# Patient Record
Sex: FEMALE | Race: WHITE | URBAN - METROPOLITAN AREA
[De-identification: names, ages, dates, MRNs, and addresses within clinical notes are randomized per-mention and may not be internally consistent; named-entity substitution may affect disease eponyms.]

---

## 2022-01-27 ENCOUNTER — OFFICE VISIT (OUTPATIENT)
Dept: URBAN - METROPOLITAN AREA CLINIC 51 | Facility: CLINIC | Age: 57
End: 2022-01-27
Payer: COMMERCIAL

## 2022-01-27 DIAGNOSIS — H25.813 COMBINED FORMS OF AGE-RELATED CATARACT, BILATERAL: ICD-10-CM

## 2022-01-27 DIAGNOSIS — H43.313 VITREOUS MEMBRANES AND STRANDS, BILATERAL: ICD-10-CM

## 2022-01-27 DIAGNOSIS — H52.4 PRESBYOPIA: ICD-10-CM

## 2022-01-27 DIAGNOSIS — H04.123 TEAR FILM INSUFFICIENCY OF BILATERAL LACRIMAL GLANDS: ICD-10-CM

## 2022-01-27 DIAGNOSIS — E11.9 TYPE 2 DIABETES MELLITUS WITHOUT COMPLICATIONS: Primary | ICD-10-CM

## 2022-01-27 DIAGNOSIS — Z79.84 LONG TERM (CURRENT) USE OF ORAL ANTIDIABETIC DRUGS: ICD-10-CM

## 2022-01-27 PROCEDURE — 92133 CPTRZD OPH DX IMG PST SGM ON: CPT | Performed by: OPTOMETRIST

## 2022-01-27 PROCEDURE — 92134 CPTRZ OPH DX IMG PST SGM RTA: CPT | Performed by: OPTOMETRIST

## 2022-01-27 PROCEDURE — 92004 COMPRE OPH EXAM NEW PT 1/>: CPT | Performed by: OPTOMETRIST

## 2022-01-27 ASSESSMENT — INTRAOCULAR PRESSURE
OD: 13
OS: 13

## 2022-01-27 ASSESSMENT — KERATOMETRY
OD: 41.77
OS: 41.88

## 2022-01-27 ASSESSMENT — VISUAL ACUITY
OS: 20/20
OD: 20/20

## 2022-01-27 NOTE — IMPRESSION/PLAN
Impression: Type 2 diabetes mellitus without complications: K13.2. Plan: No diabetic retinopathy observed on today's examination. Discussed the importance of annual diabetic eye exams. Discussed with patient importance of good blood sugar control. Recommend continue f/u care for DM control with PCP. Send report to PCP. Monitor annually with MAC OCT, FUNDUS PHOTOS.

## 2022-01-27 NOTE — IMPRESSION/PLAN
Impression: Vitreous membranes and strands, bilateral: H43.313. Plan: PVD OS. Educated patient on process of vitreous syneresis. Retina is attached 360 degrees. Pt to RTC ASAP if increase in floaters, flashes, or curtain or veil taking away vision. 
Will recheck in 1 month DFE + MOCT

## 2022-01-27 NOTE — IMPRESSION/PLAN
Impression: Tear film insufficiency of bilateral lacrimal glands: H04.123. *MGD OU Plan: Recommend AT's at least 1-2 times a day or more OU. Can use more often if needed/symptomatic.

## 2022-01-27 NOTE — IMPRESSION/PLAN
Impression: Combined forms of age-related cataract, bilateral: H25.813. Plan: Educated patient on findings. Cataracts are not visually significant or effecting ADLs/vision. No treatment is currently warranted. RTC if notice changes in vision. Monitor.